# Patient Record
Sex: MALE | Race: WHITE | NOT HISPANIC OR LATINO | Employment: FULL TIME | URBAN - METROPOLITAN AREA
[De-identification: names, ages, dates, MRNs, and addresses within clinical notes are randomized per-mention and may not be internally consistent; named-entity substitution may affect disease eponyms.]

---

## 2023-11-04 ENCOUNTER — HOSPITAL ENCOUNTER (OUTPATIENT)
Dept: RADIOLOGY | Facility: HOSPITAL | Age: 58
Discharge: HOME | End: 2023-11-04
Payer: COMMERCIAL

## 2023-11-04 DIAGNOSIS — E78.5 HYPERLIPIDEMIA, UNSPECIFIED: ICD-10-CM

## 2023-11-04 DIAGNOSIS — Z13.6 ENCOUNTER FOR SCREENING FOR CARDIOVASCULAR DISORDERS: ICD-10-CM

## 2023-11-04 PROCEDURE — 75571 CT HRT W/O DYE W/CA TEST: CPT

## 2024-08-21 ENCOUNTER — APPOINTMENT (OUTPATIENT)
Dept: PRIMARY CARE | Facility: CLINIC | Age: 59
End: 2024-08-21
Payer: COMMERCIAL

## 2024-08-21 VITALS
TEMPERATURE: 98 F | HEIGHT: 72 IN | HEART RATE: 69 BPM | OXYGEN SATURATION: 97 % | BODY MASS INDEX: 25.35 KG/M2 | WEIGHT: 187.2 LBS | SYSTOLIC BLOOD PRESSURE: 162 MMHG | DIASTOLIC BLOOD PRESSURE: 110 MMHG

## 2024-08-21 DIAGNOSIS — R03.0 ELEVATED BLOOD PRESSURE READING: ICD-10-CM

## 2024-08-21 DIAGNOSIS — E78.5 HYPERLIPIDEMIA, UNSPECIFIED HYPERLIPIDEMIA TYPE: ICD-10-CM

## 2024-08-21 DIAGNOSIS — F41.9 ANXIETY: Primary | ICD-10-CM

## 2024-08-21 PROBLEM — S62.025A CLOSED NONDISPLACED FRACTURE OF MIDDLE THIRD OF NAVICULAR BONE OF LEFT WRIST: Status: RESOLVED | Noted: 2024-08-21 | Resolved: 2024-08-21

## 2024-08-21 PROCEDURE — 80346 BENZODIAZEPINES1-12: CPT

## 2024-08-21 PROCEDURE — 3008F BODY MASS INDEX DOCD: CPT | Performed by: NURSE PRACTITIONER

## 2024-08-21 PROCEDURE — 80358 DRUG SCREENING METHADONE: CPT

## 2024-08-21 PROCEDURE — 80373 DRUG SCREENING TRAMADOL: CPT

## 2024-08-21 PROCEDURE — 80365 DRUG SCREENING OXYCODONE: CPT

## 2024-08-21 PROCEDURE — 80368 SEDATIVE HYPNOTICS: CPT

## 2024-08-21 PROCEDURE — 80361 OPIATES 1 OR MORE: CPT

## 2024-08-21 PROCEDURE — 80354 DRUG SCREENING FENTANYL: CPT

## 2024-08-21 PROCEDURE — 1036F TOBACCO NON-USER: CPT | Performed by: NURSE PRACTITIONER

## 2024-08-21 PROCEDURE — 99204 OFFICE O/P NEW MOD 45 MIN: CPT | Performed by: NURSE PRACTITIONER

## 2024-08-21 PROCEDURE — 82570 ASSAY OF URINE CREATININE: CPT

## 2024-08-21 PROCEDURE — 80307 DRUG TEST PRSMV CHEM ANLYZR: CPT

## 2024-08-21 RX ORDER — ROSUVASTATIN CALCIUM 40 MG/1
1 TABLET, COATED ORAL
COMMUNITY
Start: 2024-01-04 | End: 2024-08-21 | Stop reason: ALTCHOICE

## 2024-08-21 RX ORDER — HYDROXYZINE HYDROCHLORIDE 25 MG/1
25 TABLET, FILM COATED ORAL 2 TIMES DAILY PRN
Qty: 60 TABLET | Refills: 0 | Status: SHIPPED | OUTPATIENT
Start: 2024-08-21 | End: 2024-09-20

## 2024-08-21 RX ORDER — LORAZEPAM 1 MG/1
1 TABLET ORAL
COMMUNITY
Start: 2024-07-25

## 2024-08-21 RX ORDER — CITALOPRAM 10 MG/1
1 TABLET ORAL
COMMUNITY
Start: 2024-01-30 | End: 2024-08-21 | Stop reason: ALTCHOICE

## 2024-08-21 ASSESSMENT — PATIENT HEALTH QUESTIONNAIRE - PHQ9
SUM OF ALL RESPONSES TO PHQ9 QUESTIONS 1 AND 2: 0
2. FEELING DOWN, DEPRESSED OR HOPELESS: NOT AT ALL
1. LITTLE INTEREST OR PLEASURE IN DOING THINGS: NOT AT ALL

## 2024-08-21 NOTE — PROGRESS NOTES
Subjective   Marcos Sheridan is a 59 y.o. male who presents for New Patient Visit (Establish care with Melissa. [facility] refills. ).    HPI  He presents to the office today to establish care and discuss medication refills.   He reports he needs a refill on the Lorazepam.  Has been on the current medication for 6 years for anxiety.  He reports he wakes up around 2 AM feeling very anxious and panicked - takes one tablet (1 mg) and then a few hours later takes a second 1 mg tablet.   Once he is up and moving generally the anxiety is better.   No side effects.  No feeling sad, down, helpless or hopeless.  Motivation is good.  No SI or HI.  Wake up very nervous and nauseated- can actually vomit at times.  (+) excessive worry.  No worry about worst case scenarios  (+) panic attacks when stops taking Lorazepam  Sleep: wakes up in the early morning hours.  Caffeine use: 1 cup of of coffee a day  Alcohol use: 2-3 days a week 4-5 beers  Drug use: None- has occasionally used marijuana    OARRS:  No data recorded  I have personally reviewed the OARRS report for Marcos Sheridan. I have considered the risks of abuse, dependence, addiction and diversion and I believe that it is clinically appropriate for Marcos Sheridan to be prescribed this medication    Is the patient prescribed a combination of a benzodiazepine and opioid?  No    Last Urine Drug Screen / ordered today: No  Recent Results (from the past 8760 hour(s))   Screen Opiate/Opioid/Benzo Prescription Compliance    Collection Time: 08/21/24 12:41 PM   Result Value Ref Range    Creatinine, Urine Random 55.5 20.0 - 370.0 mg/dL    Amphetamine Screen, Urine Presumptive Negative Presumptive Negative    Barbiturate Screen, Urine Presumptive Negative Presumptive Negative    Cannabinoid Screen, Urine Presumptive Negative Presumptive Negative    Cocaine Metabolite Screen, Urine Presumptive Negative Presumptive Negative    PCP Screen, Urine Presumptive Negative Presumptive Negative      N/A  Test was ordered today.      Controlled Substance Agreement:  Date of the Last Agreement: 08/21/2024  Reviewed Controlled Substance Agreement including but not limited to the benefits, risks, and alternatives to treatment with a Controlled Substance medication(s).    Benzodiazepines:  What is the patient's goal of therapy? Help with night time anxiety  Is this being achieved with current treatment? yes    NANNETTE-7:  No data recorded    Activities of Daily Living:   Is your overall impression that this patient is benefiting (symptom reduction outweighs side effects) from benzodiazepine therapy? No  Will work on tapering the medication and look into other options     1. Physical Functioning: Better  2. Family Relationship: Better  3. Social Relationship: Better  4. Mood: Better  5. Sleep Patterns: Same  6. Overall Function: Better      Has tried Sertraline and Fluoxetine- did not like side effects and was not effective.  Most recently was on Citalopram- no help.  Has tried Buspirone- no help.  Has never seen psychiatry.    His blood pressure was elevated today. He reports he was just at the other provider office about 10 days ago and BP was 130/60's.  No chest pain, SOB or palpitations.   No headaches, numbness, tingling, weakness.   Feels it is elevated due to being extremely anxious.    Had a recent cardiac workup including stress test- will request records.  Was on a statin but stopped taking.    He has followed with the same practice for several years. Recently felt it was time for a change.    Review of Systems   Constitutional:  Negative for chills, fatigue and fever.   Respiratory:  Negative for cough, chest tightness and shortness of breath.    Cardiovascular:  Negative for chest pain, palpitations and leg swelling.   Gastrointestinal:  Positive for nausea. Negative for abdominal pain, constipation, diarrhea and vomiting.   Neurological:  Negative for dizziness, weakness, numbness and headaches.  "  Psychiatric/Behavioral:  Negative for dysphoric mood, self-injury, sleep disturbance and suicidal ideas. The patient is nervous/anxious.      Objective   BP (!) 162/110 (BP Location: Left arm, Patient Position: Sitting) Comment: denies SOB, blurry vision and dizziness  Pulse 69   Temp 36.7 °C (98 °F) (Temporal)   Ht 1.833 m (6' 0.17\")   Wt 84.9 kg (187 lb 3.2 oz)   SpO2 97%   BMI 25.27 kg/m²     Physical Exam  Constitutional:       General: He is not in acute distress.     Appearance: Normal appearance. He is not toxic-appearing.   Eyes:      Extraocular Movements: Extraocular movements intact.      Conjunctiva/sclera: Conjunctivae normal.      Pupils: Pupils are equal, round, and reactive to light.   Neck:      Vascular: No carotid bruit.   Cardiovascular:      Rate and Rhythm: Normal rate and regular rhythm.      Pulses: Normal pulses.      Heart sounds: Normal heart sounds, S1 normal and S2 normal. No murmur heard.  Pulmonary:      Effort: Pulmonary effort is normal. No respiratory distress.      Breath sounds: Normal breath sounds.   Abdominal:      General: Bowel sounds are normal.      Palpations: Abdomen is soft.      Tenderness: There is no abdominal tenderness.   Musculoskeletal:      Right lower leg: No edema.      Left lower leg: No edema.   Lymphadenopathy:      Cervical: No cervical adenopathy.   Neurological:      Mental Status: He is alert and oriented to person, place, and time.   Psychiatric:         Attention and Perception: Attention normal.         Mood and Affect: Affect normal. Mood is anxious.         Behavior: Behavior normal. Behavior is cooperative.         Thought Content: Thought content normal.         Cognition and Memory: Cognition normal.         Judgment: Judgment normal.         Assessment/Plan   Problem List Items Addressed This Visit       Hyperlipidemia     Reports he did not want to be on a statin which is why he stopped- will need to obtain all records to review and " discuss with him.          Anxiety - Primary     Long discussion with the patient today regarding daily benzodiazepines for treatment of anxiety.   He is currently not on any daily medication.   Discussed daily benzodiazepines is not recommended for treatment of anxiety.   We reviewed at length the associated risks.  Advised I will not continue to prescribe lorazepam but will help him safely taper off the medication and look into other medication options/refer to psychiatry if needed.  He reached out to prior PCP who provided a 30 day supply to taper. Given dose and length of being on Lorazepam- will slowly wean over 3 months.  Take Ativan 1 mg in the morning for 4 weeks.  Then take 0.5 mg in the morning for 4 weeks.  Then take 0.25 mg in the morning for 4 weeks, then stop.   Plan to be off the medication in 12 weeks.  The expectations of weaning were clear. Advised will not give additional prescriptions.  Hydroxyzine was ordered as needed as we wean.  Advised not to take either medication when drinking alcohol or driving.   Taking both together will cause sleepiness and should be avoided.   CSA reviewed today and signed.  UDS obtained.  He was open to working on tapering off the medication. All questions were answered.         Relevant Medications    hydrOXYzine HCL (Atarax) 25 mg tablet    Other Relevant Orders    Opiate/Opioid/Benzo Prescription Compliance    OOB Internal Tracking    Elevated blood pressure reading     Suspect due to anxiety. Will request records for comparison.   Will need close continual monitoring.             It has been a pleasure seeing you today!;.

## 2024-08-21 NOTE — PATIENT INSTRUCTIONS
Take Ativan 1 mg in the morning for 4 weeks.  Then take 0.5 mg in the morning for 4 weeks.  Then take 0.25 mg in the morning for 4 weeks, then stop. Plan to be off the medication in 12 weeks.  You may take Hydroxyzine as needed for anxiety and sleep.  Follow up in 3 months sooner if needed,

## 2024-08-22 PROBLEM — F41.9 ANXIETY: Status: ACTIVE | Noted: 2024-08-22

## 2024-08-22 PROBLEM — E78.5 HYPERLIPIDEMIA: Status: ACTIVE | Noted: 2024-08-22

## 2024-08-22 PROBLEM — R03.0 ELEVATED BLOOD PRESSURE READING: Status: ACTIVE | Noted: 2024-08-22

## 2024-08-22 LAB
AMPHETAMINES UR QL SCN: NORMAL
BARBITURATES UR QL SCN: NORMAL
BZE UR QL SCN: NORMAL
CANNABINOIDS UR QL SCN: NORMAL
CREAT UR-MCNC: 55.5 MG/DL (ref 20–370)
PCP UR QL SCN: NORMAL

## 2024-08-22 ASSESSMENT — ENCOUNTER SYMPTOMS
FEVER: 0
SHORTNESS OF BREATH: 0
DYSPHORIC MOOD: 0
COUGH: 0
CONSTIPATION: 0
NAUSEA: 1
NERVOUS/ANXIOUS: 1
WEAKNESS: 0
DIARRHEA: 0
PALPITATIONS: 0
NUMBNESS: 0
HEADACHES: 0
FATIGUE: 0
VOMITING: 0
DIZZINESS: 0
ABDOMINAL PAIN: 0
SLEEP DISTURBANCE: 0
CHEST TIGHTNESS: 0
CHILLS: 0

## 2024-08-22 NOTE — ASSESSMENT & PLAN NOTE
Reports he did not want to be on a statin which is why he stopped- will need to obtain all records to review and discuss with him.

## 2024-08-22 NOTE — ASSESSMENT & PLAN NOTE
Suspect due to anxiety. Will request records for comparison.   Will need close continual monitoring.

## 2024-08-22 NOTE — ASSESSMENT & PLAN NOTE
Long discussion with the patient today regarding daily benzodiazepines for treatment of anxiety.   He is currently not on any daily medication.   Discussed daily benzodiazepines is not recommended for treatment of anxiety.   We reviewed at length the associated risks.  Advised I will not continue to prescribe lorazepam but will help him safely taper off the medication and look into other medication options/refer to psychiatry if needed.  He reached out to prior PCP who provided a 30 day supply to taper. Given dose and length of being on Lorazepam- will slowly wean over 3 months.  Take Ativan 1 mg in the morning for 4 weeks.  Then take 0.5 mg in the morning for 4 weeks.  Then take 0.25 mg in the morning for 4 weeks, then stop.   Plan to be off the medication in 12 weeks.  The expectations of weaning were clear. Advised will not give additional prescriptions.  Hydroxyzine was ordered as needed as we wean.  Advised not to take either medication when drinking alcohol or driving.   Taking both together will cause sleepiness and should be avoided.   CSA reviewed today and signed.  UDS obtained.  He was open to working on tapering off the medication. All questions were answered.

## 2024-08-23 LAB
1OH-MIDAZOLAM UR CFM-MCNC: <25 NG/ML
6MAM UR CFM-MCNC: <25 NG/ML
7AMINOCLONAZEPAM UR CFM-MCNC: <25 NG/ML
A-OH ALPRAZ UR CFM-MCNC: <25 NG/ML
ALPRAZ UR CFM-MCNC: <25 NG/ML
CHLORDIAZEP UR CFM-MCNC: <25 NG/ML
CLONAZEPAM UR CFM-MCNC: <25 NG/ML
CODEINE UR CFM-MCNC: <50 NG/ML
DIAZEPAM UR CFM-MCNC: <25 NG/ML
EDDP UR CFM-MCNC: <25 NG/ML
FENTANYL UR CFM-MCNC: <2.5 NG/ML
HYDROCODONE CTO UR CFM-MCNC: <25 NG/ML
HYDROMORPHONE UR CFM-MCNC: <25 NG/ML
LORAZEPAM UR CFM-MCNC: 478 NG/ML
METHADONE UR CFM-MCNC: <25 NG/ML
MIDAZOLAM UR CFM-MCNC: <25 NG/ML
MORPHINE UR CFM-MCNC: <50 NG/ML
NORDIAZEPAM UR CFM-MCNC: <25 NG/ML
NORFENTANYL UR CFM-MCNC: <2.5 NG/ML
NORHYDROCODONE UR CFM-MCNC: <25 NG/ML
NOROXYCODONE UR CFM-MCNC: <25 NG/ML
NORTRAMADOL UR-MCNC: <50 NG/ML
OXAZEPAM UR CFM-MCNC: <25 NG/ML
OXYCODONE UR CFM-MCNC: <25 NG/ML
OXYMORPHONE UR CFM-MCNC: <25 NG/ML
TEMAZEPAM UR CFM-MCNC: <25 NG/ML
TRAMADOL UR CFM-MCNC: <50 NG/ML
ZOLPIDEM UR CFM-MCNC: <25 NG/ML
ZOLPIDEM UR-MCNC: <25 NG/ML

## 2024-08-28 ENCOUNTER — TELEPHONE (OUTPATIENT)
Dept: PRIMARY CARE | Facility: CLINIC | Age: 59
End: 2024-08-28
Payer: COMMERCIAL

## 2024-08-28 NOTE — TELEPHONE ENCOUNTER
----- Message from Melissa Ritika sent at 8/27/2024 10:55 PM EDT -----  Please let him know his urine drug screen is consistent with taking the Lorazepam (Ativan).  I would like to have him sign a release of records from his prior PCP.  I would also like to see him in about 4 to 6 weeks to see how he is doing and also review his records with him.  Please assist in scheduling.  He should let me know if he needs a refill prior to his visit.

## 2024-08-28 NOTE — TELEPHONE ENCOUNTER
LM on pt voicemail regarding this information. He needs to sign a release BEFORE his appt is set with Melissa so sh can discuss his records/review them with pt at his appt.

## 2024-09-09 DIAGNOSIS — F41.9 ANXIETY: Primary | ICD-10-CM

## 2024-09-09 RX ORDER — LORAZEPAM 1 MG/1
1 TABLET ORAL
OUTPATIENT
Start: 2024-09-09

## 2024-09-09 RX ORDER — LORAZEPAM 0.5 MG/1
0.5 TABLET ORAL DAILY
Qty: 30 TABLET | Refills: 0 | Status: SHIPPED | OUTPATIENT
Start: 2024-09-20 | End: 2024-10-20

## 2024-09-09 NOTE — TELEPHONE ENCOUNTER
Rx Refill Request Telephone Encounter  LORazepam (Ativan) 1 mg tablet    Pharmacy [facility] [place]     NOV 10/2/24

## 2024-10-02 ENCOUNTER — APPOINTMENT (OUTPATIENT)
Dept: PRIMARY CARE | Facility: CLINIC | Age: 59
End: 2024-10-02
Payer: COMMERCIAL

## 2024-10-02 VITALS
SYSTOLIC BLOOD PRESSURE: 150 MMHG | DIASTOLIC BLOOD PRESSURE: 97 MMHG | OXYGEN SATURATION: 96 % | WEIGHT: 186.6 LBS | TEMPERATURE: 97.3 F | BODY MASS INDEX: 25.19 KG/M2 | HEART RATE: 62 BPM

## 2024-10-02 DIAGNOSIS — F10.20 ALCOHOL DEPENDENCE, DAILY USE (MULTI): ICD-10-CM

## 2024-10-02 DIAGNOSIS — R03.0 ELEVATED BLOOD PRESSURE READING: ICD-10-CM

## 2024-10-02 DIAGNOSIS — I25.10 PRECLINICAL CORONARY ARTERY DISEASE: ICD-10-CM

## 2024-10-02 DIAGNOSIS — F41.9 ANXIETY: Primary | ICD-10-CM

## 2024-10-02 DIAGNOSIS — E78.5 HYPERLIPIDEMIA, UNSPECIFIED HYPERLIPIDEMIA TYPE: ICD-10-CM

## 2024-10-02 PROCEDURE — 99214 OFFICE O/P EST MOD 30 MIN: CPT | Performed by: NURSE PRACTITIONER

## 2024-10-02 RX ORDER — LORAZEPAM 0.5 MG/1
0.25 TABLET ORAL DAILY
Qty: 15 TABLET | Refills: 0 | Status: SHIPPED | OUTPATIENT
Start: 2024-10-20

## 2024-10-02 RX ORDER — ROSUVASTATIN CALCIUM 20 MG/1
20 TABLET, COATED ORAL DAILY
Qty: 90 TABLET | Refills: 0 | Status: SHIPPED | OUTPATIENT
Start: 2024-10-02

## 2024-10-02 RX ORDER — BUSPIRONE HYDROCHLORIDE 5 MG/1
5 TABLET ORAL 2 TIMES DAILY
Qty: 60 TABLET | Refills: 1 | Status: SHIPPED | OUTPATIENT
Start: 2024-10-02

## 2024-10-02 ASSESSMENT — PATIENT HEALTH QUESTIONNAIRE - PHQ9
SUM OF ALL RESPONSES TO PHQ9 QUESTIONS 1 & 2: 0
1. LITTLE INTEREST OR PLEASURE IN DOING THINGS: NOT AT ALL
2. FEELING DOWN, DEPRESSED OR HOPELESS: NOT AT ALL

## 2024-10-02 NOTE — PROGRESS NOTES
Subjective    Marcos Sheridan is a 59 y.o. male who presents for Follow-up (Medication change.).    HPI  He presents to the office today for a follow up on anxiety.  He is taking the medication as prescribed. He has continued to taper the Lorazepam. Reports it has been challenging.  No side effects on the medication.  He is taking the Hydroxyzine which helps with sleep at night but does not like how he feels during the day.  He has tried several SSRI's in the past (although not at optimal dose, had side effects on some of them.)  No feeling sad, down, helpless or hopeless.  Motivation is good.  No SI or HI.  (+) excessive worry  (+) worry about worst case scenarios  No panic attacks.  Sleeping about 7-8 hours a night (interrupted)  No depression.  No feeling sad, down, helpless or hopeless.   No SI or HI.     Exercise: exercises regularly  Alcohol use 3-5 light beers a night- reports has been doing since the summer  Drug use: No marijuana use      Reviewed old records obtained.   Will need updated labs.  He had a negative stress ECHO after having elevated CT cardiac score (2/2024)  Reports joint aches on Crestor 40 mg daily so quit taking.   No chest pain, SOB, palpitations,  No headaches, numbness, tingling, weakness.    Review of Systems   Constitutional:  Negative for chills, fatigue and fever.   Eyes:  Negative for visual disturbance.   Respiratory:  Negative for cough, chest tightness and shortness of breath.    Cardiovascular:  Negative for chest pain, palpitations and leg swelling.   Gastrointestinal:  Negative for abdominal pain, diarrhea, nausea and vomiting.   Neurological:  Negative for dizziness, weakness, numbness and headaches.   Psychiatric/Behavioral:  Positive for sleep disturbance. Negative for dysphoric mood, self-injury and suicidal ideas. The patient is nervous/anxious.      Objective   BP (!) 150/97 (BP Location: Left arm, Patient Position: Sitting, BP Cuff Size: Adult)   Pulse 62   Temp 36.3  °C (97.3 °F) (Temporal)   Wt 84.6 kg (186 lb 9.6 oz)   SpO2 96%   BMI 25.19 kg/m²     Physical Exam  Constitutional:       General: He is not in acute distress.     Appearance: Normal appearance. He is not toxic-appearing.   Eyes:      Extraocular Movements: Extraocular movements intact.      Conjunctiva/sclera: Conjunctivae normal.      Pupils: Pupils are equal, round, and reactive to light.   Neck:      Vascular: No carotid bruit.   Cardiovascular:      Rate and Rhythm: Normal rate and regular rhythm.      Pulses: Normal pulses.      Heart sounds: Normal heart sounds, S1 normal and S2 normal. No murmur heard.  Pulmonary:      Effort: Pulmonary effort is normal. No respiratory distress.      Breath sounds: Normal breath sounds.   Abdominal:      General: Bowel sounds are normal.      Palpations: Abdomen is soft.      Tenderness: There is no abdominal tenderness.   Musculoskeletal:      Right lower leg: No edema.      Left lower leg: No edema.   Lymphadenopathy:      Cervical: No cervical adenopathy.   Neurological:      Mental Status: He is alert and oriented to person, place, and time.   Psychiatric:         Attention and Perception: Attention normal.         Mood and Affect: Mood and affect normal.         Behavior: Behavior normal. Behavior is cooperative.         Thought Content: Thought content normal.         Cognition and Memory: Cognition normal.         Judgment: Judgment normal.         Assessment/Plan   Problem List Items Addressed This Visit       Hyperlipidemia     Extremely elevated on labs 2023. Needs updated fasting labs- ordered today.  Discussed the importance of a statin.         Anxiety - Primary     Will continue Lorazepam taper as originally planned ( 2 more weeks of 0.5 mg daily and then 4 weeks of 0.25 mg daily). He is drinking several alcoholic drinks a day. Advised should not be drinking with Lorazepam.   Started Buspirone so he has something to take during the day and evening as we  taper as he has not done well with SSRIs.  He plans to stop the Hydroxyzine.   We discussed a referral to psychiatry. He is not currently interested.         Relevant Medications    busPIRone (Buspar) 5 mg tablet    LORazepam (Ativan) 0.5 mg tablet (Start on 10/20/2024)    Other Relevant Orders    TSH with reflex to Free T4 if abnormal    Elevated blood pressure reading     Suspect anxiety related- his BP was good at visit in 8/2024 with last provider. Will need to continue to monitor.         Preclinical coronary artery disease     Clinically asymptomatic. Negative stress ECHO 2/2024. Reviewed at length CT cardiac score results. Advised he should be taking ASA 81 mg daily.   Also discussed the importance of a statin today.   Will need updated labs.  Sent in Rosuvastatin 20 mg to see if better tolerated- he is not sure he will take.   Gave the option of discussing with cardiology- he is not interested at this time.          Relevant Medications    rosuvastatin (Crestor) 20 mg tablet    Other Relevant Orders    Comprehensive Metabolic Panel    CBC    Lipid Panel    Alcohol dependence, daily use (Multi)     Discussed a referral to addiction medicine today as this is likely a contributing factor to his anxiety. He declines at this time but will let me know if he should change his mind.             It has been a pleasure seeing you today!

## 2024-10-02 NOTE — PATIENT INSTRUCTIONS
Begin taking ASA 81 mg daily.  Rosuvastatin was sent to the pharmacy (20 mg dose) to see if able to better tolerate.   Continue to wean the Lorazepam as directed.  Buspirone twice a day. Possible side effects reviewed.   Let me know if you change your mind about a referral to cardiology, addiction medication or psychiatry.   Follow up as planned in 2 months.

## 2024-10-03 PROBLEM — I25.10 PRECLINICAL CORONARY ARTERY DISEASE: Status: ACTIVE | Noted: 2024-10-03

## 2024-10-03 PROBLEM — F10.20 ALCOHOL DEPENDENCE, DAILY USE (MULTI): Status: ACTIVE | Noted: 2024-10-03

## 2024-10-03 ASSESSMENT — ENCOUNTER SYMPTOMS
COUGH: 0
NERVOUS/ANXIOUS: 1
SLEEP DISTURBANCE: 1
DIARRHEA: 0
DIZZINESS: 0
FATIGUE: 0
ABDOMINAL PAIN: 0
SHORTNESS OF BREATH: 0
NUMBNESS: 0
FEVER: 0
VOMITING: 0
CHEST TIGHTNESS: 0
NAUSEA: 0
CHILLS: 0
PALPITATIONS: 0
HEADACHES: 0
DYSPHORIC MOOD: 0
WEAKNESS: 0

## 2024-10-03 NOTE — ASSESSMENT & PLAN NOTE
Discussed a referral to addiction medicine today as this is likely a contributing factor to his anxiety. He declines at this time but will let me know if he should change his mind.

## 2024-10-03 NOTE — ASSESSMENT & PLAN NOTE
Suspect anxiety related- his BP was good at visit in 8/2024 with last provider. Will need to continue to monitor.

## 2024-10-03 NOTE — ASSESSMENT & PLAN NOTE
Will continue Lorazepam taper as originally planned ( 2 more weeks of 0.5 mg daily and then 4 weeks of 0.25 mg daily). He is drinking several alcoholic drinks a day. Advised should not be drinking with Lorazepam.   Started Buspirone so he has something to take during the day and evening as we taper as he has not done well with SSRIs.  He plans to stop the Hydroxyzine.   We discussed a referral to psychiatry. He is not currently interested.

## 2024-10-03 NOTE — ASSESSMENT & PLAN NOTE
Extremely elevated on labs 2023. Needs updated fasting labs- ordered today.  Discussed the importance of a statin.

## 2024-10-03 NOTE — ASSESSMENT & PLAN NOTE
Clinically asymptomatic. Negative stress ECHO 2/2024. Reviewed at length CT cardiac score results. Advised he should be taking ASA 81 mg daily.   Also discussed the importance of a statin today.   Will need updated labs.  Sent in Rosuvastatin 20 mg to see if better tolerated- he is not sure he will take.   Gave the option of discussing with cardiology- he is not interested at this time.

## 2024-10-15 ENCOUNTER — LAB (OUTPATIENT)
Dept: LAB | Facility: LAB | Age: 59
End: 2024-10-15
Payer: COMMERCIAL

## 2024-10-15 DIAGNOSIS — I25.10 PRECLINICAL CORONARY ARTERY DISEASE: ICD-10-CM

## 2024-10-15 DIAGNOSIS — F41.9 ANXIETY: ICD-10-CM

## 2024-10-15 LAB
ALBUMIN SERPL BCP-MCNC: 4.7 G/DL (ref 3.4–5)
ALP SERPL-CCNC: 53 U/L (ref 33–120)
ALT SERPL W P-5'-P-CCNC: 37 U/L (ref 10–52)
ANION GAP SERPL CALC-SCNC: 14 MMOL/L (ref 10–20)
AST SERPL W P-5'-P-CCNC: 49 U/L (ref 9–39)
BILIRUB SERPL-MCNC: 0.9 MG/DL (ref 0–1.2)
BUN SERPL-MCNC: 10 MG/DL (ref 6–23)
CALCIUM SERPL-MCNC: 10 MG/DL (ref 8.6–10.3)
CHLORIDE SERPL-SCNC: 100 MMOL/L (ref 98–107)
CHOLEST SERPL-MCNC: 263 MG/DL (ref 0–199)
CHOLESTEROL/HDL RATIO: 2.3
CO2 SERPL-SCNC: 28 MMOL/L (ref 21–32)
CREAT SERPL-MCNC: 1.06 MG/DL (ref 0.5–1.3)
EGFRCR SERPLBLD CKD-EPI 2021: 81 ML/MIN/1.73M*2
ERYTHROCYTE [DISTWIDTH] IN BLOOD BY AUTOMATED COUNT: 12.6 % (ref 11.5–14.5)
GLUCOSE SERPL-MCNC: 94 MG/DL (ref 74–99)
HCT VFR BLD AUTO: 46 % (ref 41–52)
HDLC SERPL-MCNC: 112.4 MG/DL
HGB BLD-MCNC: 15.8 G/DL (ref 13.5–17.5)
LDLC SERPL CALC-MCNC: 139 MG/DL
MCH RBC QN AUTO: 32.3 PG (ref 26–34)
MCHC RBC AUTO-ENTMCNC: 34.3 G/DL (ref 32–36)
MCV RBC AUTO: 94 FL (ref 80–100)
NON HDL CHOLESTEROL: 151 MG/DL (ref 0–149)
NRBC BLD-RTO: 0 /100 WBCS (ref 0–0)
PLATELET # BLD AUTO: 264 X10*3/UL (ref 150–450)
POTASSIUM SERPL-SCNC: 4.4 MMOL/L (ref 3.5–5.3)
PROT SERPL-MCNC: 7.3 G/DL (ref 6.4–8.2)
RBC # BLD AUTO: 4.89 X10*6/UL (ref 4.5–5.9)
SODIUM SERPL-SCNC: 138 MMOL/L (ref 136–145)
TRIGL SERPL-MCNC: 56 MG/DL (ref 0–149)
TSH SERPL-ACNC: 3.93 MIU/L (ref 0.44–3.98)
VLDL: 11 MG/DL (ref 0–40)
WBC # BLD AUTO: 6.6 X10*3/UL (ref 4.4–11.3)

## 2024-10-15 PROCEDURE — 80061 LIPID PANEL: CPT

## 2024-10-15 PROCEDURE — 85027 COMPLETE CBC AUTOMATED: CPT

## 2024-10-15 PROCEDURE — 80053 COMPREHEN METABOLIC PANEL: CPT

## 2024-10-15 PROCEDURE — 36415 COLL VENOUS BLD VENIPUNCTURE: CPT

## 2024-10-15 PROCEDURE — 84443 ASSAY THYROID STIM HORMONE: CPT

## 2024-10-16 ENCOUNTER — TELEPHONE (OUTPATIENT)
Dept: PRIMARY CARE | Facility: CLINIC | Age: 59
End: 2024-10-16
Payer: COMMERCIAL

## 2024-10-16 NOTE — TELEPHONE ENCOUNTER
----- Message from Melissa Ritika sent at 10/15/2024 10:23 PM EDT -----  Please let him know his AST (one liver enzyme) was minimally elevated. We will discuss rechecking at his follow up visit. Thyroid level was good. Blood count looked good. Cholesterol is elevated- recommend starting statin as discussed at visit. Will review his labs in further detail at his follow up visit.

## 2024-11-11 DIAGNOSIS — F41.9 ANXIETY: ICD-10-CM

## 2024-11-11 RX ORDER — BUSPIRONE HYDROCHLORIDE 5 MG/1
5 TABLET ORAL 2 TIMES DAILY
Qty: 60 TABLET | Refills: 1 | Status: SHIPPED | OUTPATIENT
Start: 2024-11-11

## 2024-11-25 ENCOUNTER — APPOINTMENT (OUTPATIENT)
Dept: PRIMARY CARE | Facility: CLINIC | Age: 59
End: 2024-11-25
Payer: COMMERCIAL

## 2024-11-25 VITALS
BODY MASS INDEX: 25.65 KG/M2 | SYSTOLIC BLOOD PRESSURE: 130 MMHG | WEIGHT: 190 LBS | OXYGEN SATURATION: 100 % | DIASTOLIC BLOOD PRESSURE: 90 MMHG | TEMPERATURE: 98 F | HEART RATE: 87 BPM

## 2024-11-25 DIAGNOSIS — F41.9 ANXIETY: Primary | ICD-10-CM

## 2024-11-25 DIAGNOSIS — I25.10 PRECLINICAL CORONARY ARTERY DISEASE: ICD-10-CM

## 2024-11-25 DIAGNOSIS — R03.0 ELEVATED BLOOD PRESSURE READING: ICD-10-CM

## 2024-11-25 DIAGNOSIS — E78.5 HYPERLIPIDEMIA, UNSPECIFIED HYPERLIPIDEMIA TYPE: ICD-10-CM

## 2024-11-25 PROCEDURE — 99214 OFFICE O/P EST MOD 30 MIN: CPT | Performed by: NURSE PRACTITIONER

## 2024-11-25 PROCEDURE — 1036F TOBACCO NON-USER: CPT | Performed by: NURSE PRACTITIONER

## 2024-11-25 RX ORDER — VENLAFAXINE HYDROCHLORIDE 37.5 MG/1
37.5 CAPSULE, EXTENDED RELEASE ORAL DAILY
Qty: 30 CAPSULE | Refills: 1 | Status: SHIPPED | OUTPATIENT
Start: 2024-11-25 | End: 2025-01-24

## 2024-11-25 ASSESSMENT — PATIENT HEALTH QUESTIONNAIRE - PHQ9
2. FEELING DOWN, DEPRESSED OR HOPELESS: NOT AT ALL
SUM OF ALL RESPONSES TO PHQ9 QUESTIONS 1 AND 2: 0
1. LITTLE INTEREST OR PLEASURE IN DOING THINGS: NOT AT ALL

## 2024-11-25 ASSESSMENT — ENCOUNTER SYMPTOMS
CHEST TIGHTNESS: 0
VOMITING: 0
CHILLS: 0
SLEEP DISTURBANCE: 1
DIARRHEA: 0
ABDOMINAL PAIN: 0
WEAKNESS: 0
SHORTNESS OF BREATH: 0
PALPITATIONS: 0
FEVER: 0
DIZZINESS: 0
HEADACHES: 0
NUMBNESS: 0
COUGH: 0
FATIGUE: 0
DYSPHORIC MOOD: 0
NERVOUS/ANXIOUS: 1
NAUSEA: 0

## 2024-11-25 NOTE — PATIENT INSTRUCTIONS
Begin taking the Venlafaxine in the morning.  You may take the Buspirone as needed.   Make sure to take ASA 81 mg daily.  Follow up in 2 months.

## 2024-11-25 NOTE — PROGRESS NOTES
Subjective   Marcos Sheridan is a 59 y.o. male who presents for 3 mon fu (Would like to talk about going back on Lorazepam) and Flu Vaccine (Pt denied).    HPI  He presents to the office today for a follow up.  He is tolerating the statin well at current dose- no side effects. No chest pain, shortness of breath, palpitations or edema. No headaches, numbness, tingling, weakness or vision changes.  No dizziness.    He is taking the Buspirone as prescribed.   He has been off the Lorazepam for several weeks after a 3 month taper.  He is taking the Buspirone in the morning around 2:30 and 5:30 am.  He does not feel it is helping.  He wants to restart Lorazepam.  Wakes up and doesn't think he can fall back to sleep.  No side effects.  No feeling sad, down, helpless or hopeless.  Motivation is good.  No SI or HI.  (+) nervous feeling all day long  (+) panic attacks- always occurring in the middle of the night.  Gets a sweat- get a rush of energy and sweaty, voice is weak and hoarse, SOB.  Sleeps well until 2 am.  Diet: healthy  Exercise:  Several days 6 days a week (body weight exercises and cardio).  Caffeine use: 1 cup of coffee in the morning  Alcohol use: 3-5 beers most nights  Drug use: None    Review of Systems   Constitutional:  Negative for chills, fatigue and fever.   Eyes:  Negative for visual disturbance.   Respiratory:  Negative for cough, chest tightness and shortness of breath.    Cardiovascular:  Negative for chest pain, palpitations and leg swelling.   Gastrointestinal:  Negative for abdominal pain, diarrhea, nausea and vomiting.   Neurological:  Negative for dizziness, weakness, numbness and headaches.   Psychiatric/Behavioral:  Positive for sleep disturbance. Negative for dysphoric mood, self-injury and suicidal ideas. The patient is nervous/anxious.      Objective   /90 (BP Location: Left arm, Patient Position: Sitting)   Pulse 87   Temp 36.7 °C (98 °F) (Temporal)   Wt 86.2 kg (190 lb)   SpO2  100%   BMI 25.65 kg/m²     Physical Exam  Constitutional:       General: He is not in acute distress.     Appearance: Normal appearance. He is not toxic-appearing.   Eyes:      Extraocular Movements: Extraocular movements intact.      Conjunctiva/sclera: Conjunctivae normal.      Pupils: Pupils are equal, round, and reactive to light.   Cardiovascular:      Rate and Rhythm: Normal rate and regular rhythm.      Pulses: Normal pulses.      Heart sounds: Normal heart sounds, S1 normal and S2 normal. No murmur heard.  Pulmonary:      Effort: Pulmonary effort is normal. No respiratory distress.      Breath sounds: Normal breath sounds.   Abdominal:      General: Bowel sounds are normal.      Palpations: Abdomen is soft.      Tenderness: There is no abdominal tenderness.   Musculoskeletal:      Right lower leg: No edema.      Left lower leg: No edema.   Lymphadenopathy:      Cervical: No cervical adenopathy.   Neurological:      Mental Status: He is alert and oriented to person, place, and time.   Psychiatric:         Attention and Perception: Attention normal.         Mood and Affect: Mood and affect normal.         Behavior: Behavior normal. Behavior is cooperative.         Thought Content: Thought content normal.         Cognition and Memory: Cognition normal.         Judgment: Judgment normal.         Assessment/Plan   Problem List Items Addressed This Visit       Hyperlipidemia     Continue Rosuvastatin at the current dose. Check labs.         Relevant Orders    Hepatic function panel    Lipid Panel    Anxiety - Primary     Discussed today that we will not restart Lorazepam or any benzodiazepine (completed the 3 months taper). He does not feel Buspirone is helpful. He has tried SSRI but never SNRI. He would like to try Venlafaxine. Reviewed possible side effects today. Also discussed the option of seeing psychiatry to discuss other options for anxiety. He would like to hold off for now.         Relevant Medications     venlafaxine XR (Effexor-XR) 37.5 mg 24 hr capsule    Elevated blood pressure reading     Improved significantly today.         Preclinical coronary artery disease     He is doing well on the statin. Recommended continuing.   Advised to start ASA 81 mg daily.         Relevant Orders    Hepatic function panel    Lipid Panel       It has been a pleasure seeing you today!

## 2024-11-26 NOTE — ASSESSMENT & PLAN NOTE
Discussed today that we will not restart Lorazepam or any benzodiazepine (completed the 3 months taper). He does not feel Buspirone is helpful. He has tried SSRI but never SNRI. He would like to try Venlafaxine. Reviewed possible side effects today. Also discussed the option of seeing psychiatry to discuss other options for anxiety. He would like to hold off for now.

## 2024-12-03 ENCOUNTER — APPOINTMENT (OUTPATIENT)
Dept: PRIMARY CARE | Facility: CLINIC | Age: 59
End: 2024-12-03
Payer: COMMERCIAL

## 2024-12-20 ENCOUNTER — TELEPHONE (OUTPATIENT)
Dept: PRIMARY CARE | Facility: CLINIC | Age: 59
End: 2024-12-20
Payer: COMMERCIAL

## 2024-12-20 DIAGNOSIS — F41.9 ANXIETY: ICD-10-CM

## 2024-12-20 RX ORDER — VENLAFAXINE HYDROCHLORIDE 37.5 MG/1
75 CAPSULE, EXTENDED RELEASE ORAL DAILY
Qty: 60 CAPSULE | Refills: 0 | Status: SHIPPED | OUTPATIENT
Start: 2024-12-20 | End: 2025-02-18

## 2024-12-20 NOTE — TELEPHONE ENCOUNTER
Pt is calling in saying he increased his Venlafaxine 37.5mg to BID- he is asking if that is ok with you? - he said if it is okay then he will need a refill prior to his visit since he would them run out. He believes when he takes two of the tablets that it works better.

## 2025-01-27 ENCOUNTER — TELEPHONE (OUTPATIENT)
Dept: PRIMARY CARE | Facility: CLINIC | Age: 60
End: 2025-01-27

## 2025-01-27 ENCOUNTER — APPOINTMENT (OUTPATIENT)
Dept: PRIMARY CARE | Facility: CLINIC | Age: 60
End: 2025-01-27
Payer: COMMERCIAL

## 2025-01-27 DIAGNOSIS — F41.9 ANXIETY: Primary | ICD-10-CM
